# Patient Record
Sex: MALE | Race: WHITE
[De-identification: names, ages, dates, MRNs, and addresses within clinical notes are randomized per-mention and may not be internally consistent; named-entity substitution may affect disease eponyms.]

---

## 2017-02-24 ENCOUNTER — HOSPITAL ENCOUNTER (EMERGENCY)
Dept: HOSPITAL 19 - COL.ER | Age: 77
Discharge: HOME | End: 2017-02-24
Payer: MEDICARE

## 2017-02-24 VITALS — DIASTOLIC BLOOD PRESSURE: 88 MMHG | SYSTOLIC BLOOD PRESSURE: 141 MMHG | HEART RATE: 83 BPM

## 2017-02-24 VITALS — TEMPERATURE: 98.4 F

## 2017-02-24 VITALS — BODY MASS INDEX: 27.8 KG/M2 | HEIGHT: 67.99 IN | WEIGHT: 183.42 LBS

## 2017-02-24 DIAGNOSIS — R20.0: Primary | ICD-10-CM

## 2017-02-24 DIAGNOSIS — Z95.5: ICD-10-CM

## 2017-02-24 DIAGNOSIS — I10: ICD-10-CM

## 2017-02-24 LAB
ADJUSTED CALCIUM: 9.7 MG/DL (ref 8.4–10.2)
ALBUMIN SERPL-MCNC: 4.2 GM/DL (ref 3.5–5)
ALP SERPL-CCNC: 70 U/L (ref 50–136)
ALT SERPL-CCNC: 29 U/L (ref 21–72)
ANION GAP SERPL CALC-SCNC: 12 MMOL/L (ref 7–16)
BASOPHILS # BLD: 0 10*3/UL (ref 0–0.2)
BASOPHILS NFR BLD AUTO: 0.5 % (ref 0–2)
BILIRUB SERPL-MCNC: 0.9 MG/DL (ref 0–1)
BUN SERPL-MCNC: 28 MG/DL (ref 9–20)
CALCIUM SERPL-MCNC: 9.9 MG/DL (ref 8.4–10.2)
CHLORIDE SERPL-SCNC: 100 MMOL/L (ref 98–107)
CO2 SERPL-SCNC: 27 MMOL/L (ref 22–30)
CREAT SERPL-SCNC: 1.21 MG/DL (ref 0.66–1.25)
CRP SERPL-MCNC: 1.1 MG/DL (ref 0–0.9)
EOSINOPHIL # BLD: 0 10*3/UL (ref 0–0.7)
EOSINOPHIL NFR BLD: 0.7 % (ref 0–4)
ERYTHROCYTE [DISTWIDTH] IN BLOOD BY AUTOMATED COUNT: 14.4 % (ref 11.5–14.5)
ERYTHROCYTE [SEDIMENTATION RATE] IN BLOOD: 6 MM/HR (ref 0–30)
GLUCOSE SERPL-MCNC: 129 MG/DL (ref 74–106)
GRANULOCYTES # BLD AUTO: 78.2 % (ref 42.2–75.2)
HCT VFR BLD AUTO: 34.8 % (ref 42–52)
HGB BLD-MCNC: 12.6 G/DL (ref 13.5–18)
LYMPHOCYTES # BLD: 0.8 10*3/UL (ref 1.2–3.4)
LYMPHOCYTES NFR BLD: 14.2 % (ref 20–51)
MAGNESIUM SERPL-MCNC: 1.7 MG/DL (ref 1.6–2.3)
MCH RBC QN AUTO: 35 PG (ref 27–31)
MCHC RBC AUTO-ENTMCNC: 36 G/DL (ref 33–37)
MCV RBC AUTO: 96 FL (ref 80–100)
MONOCYTES # BLD: 0.4 10*3/UL (ref 0.1–0.6)
MONOCYTES NFR BLD AUTO: 6.2 % (ref 1.7–9.3)
NEUTROPHILS # BLD: 4.5 10*3/UL (ref 1.4–6.5)
PLATELET # BLD AUTO: 193 K/MM3 (ref 130–400)
PMV BLD AUTO: 9.7 FL (ref 7.4–10.4)
POTASSIUM SERPL-SCNC: 4.2 MMOL/L (ref 3.4–5)
PROT SERPL-MCNC: 7.7 GM/DL (ref 6.4–8.2)
RBC # BLD AUTO: 3.62 M/MM3 (ref 4.2–5.6)
SODIUM SERPL-SCNC: 139 MMOL/L (ref 137–145)
WBC # BLD AUTO: 5.8 K/MM3 (ref 4.8–10.8)

## 2017-09-18 ENCOUNTER — HOSPITAL ENCOUNTER (OUTPATIENT)
Dept: HOSPITAL 19 - COL.RAD | Age: 77
End: 2017-09-18
Payer: MEDICARE

## 2017-09-18 DIAGNOSIS — R20.0: Primary | ICD-10-CM

## 2017-09-18 DIAGNOSIS — R20.2: ICD-10-CM

## 2017-10-19 ENCOUNTER — HOSPITAL ENCOUNTER (OUTPATIENT)
Dept: HOSPITAL 19 - SDCO | Age: 77
Discharge: HOME | End: 2017-10-19
Attending: UROLOGY
Payer: MEDICARE

## 2017-10-19 VITALS — SYSTOLIC BLOOD PRESSURE: 129 MMHG | DIASTOLIC BLOOD PRESSURE: 59 MMHG | HEART RATE: 78 BPM

## 2017-10-19 VITALS — HEIGHT: 68 IN | BODY MASS INDEX: 27.47 KG/M2 | WEIGHT: 181.22 LBS

## 2017-10-19 VITALS — SYSTOLIC BLOOD PRESSURE: 132 MMHG | HEART RATE: 63 BPM | DIASTOLIC BLOOD PRESSURE: 58 MMHG | TEMPERATURE: 98 F

## 2017-10-19 VITALS — HEART RATE: 76 BPM | DIASTOLIC BLOOD PRESSURE: 51 MMHG | SYSTOLIC BLOOD PRESSURE: 134 MMHG

## 2017-10-19 VITALS — HEART RATE: 93 BPM | SYSTOLIC BLOOD PRESSURE: 136 MMHG | TEMPERATURE: 97 F | DIASTOLIC BLOOD PRESSURE: 50 MMHG

## 2017-10-19 DIAGNOSIS — I25.10: ICD-10-CM

## 2017-10-19 DIAGNOSIS — Z87.891: ICD-10-CM

## 2017-10-19 DIAGNOSIS — Z85.54: Primary | ICD-10-CM

## 2017-10-19 DIAGNOSIS — I42.9: ICD-10-CM

## 2017-10-19 DIAGNOSIS — E11.9: ICD-10-CM

## 2017-10-19 DIAGNOSIS — G47.33: ICD-10-CM

## 2017-10-19 DIAGNOSIS — I10: ICD-10-CM

## 2017-10-19 DIAGNOSIS — N41.1: ICD-10-CM

## 2017-10-19 DIAGNOSIS — J44.9: ICD-10-CM

## 2017-10-19 DIAGNOSIS — Z85.828: ICD-10-CM

## 2017-10-19 DIAGNOSIS — K21.9: ICD-10-CM

## 2017-10-19 DIAGNOSIS — E78.5: ICD-10-CM

## 2017-10-19 DIAGNOSIS — M10.9: ICD-10-CM

## 2017-10-19 PROCEDURE — C1769 GUIDE WIRE: HCPCS

## 2018-07-16 ENCOUNTER — HOSPITAL ENCOUNTER (OUTPATIENT)
Dept: HOSPITAL 19 - COL.VAS | Age: 78
End: 2018-07-16
Attending: FAMILY MEDICINE
Payer: MEDICARE

## 2018-07-16 DIAGNOSIS — I08.0: Primary | ICD-10-CM

## 2019-01-03 ENCOUNTER — HOSPITAL ENCOUNTER (OUTPATIENT)
Dept: HOSPITAL 19 - COL.LAB | Age: 79
End: 2019-01-03
Attending: INTERNAL MEDICINE
Payer: MEDICARE

## 2019-01-03 DIAGNOSIS — R91.8: Primary | ICD-10-CM

## 2019-02-12 ENCOUNTER — HOSPITAL ENCOUNTER (OUTPATIENT)
Dept: HOSPITAL 19 - COL.LAB | Age: 79
End: 2019-02-12
Attending: THORACIC SURGERY (CARDIOTHORACIC VASCULAR SURGERY)
Payer: MEDICARE

## 2019-02-12 DIAGNOSIS — R06.02: ICD-10-CM

## 2019-02-12 DIAGNOSIS — R91.8: Primary | ICD-10-CM

## 2019-02-12 LAB
ERYTHROCYTE [DISTWIDTH] IN BLOOD BY AUTOMATED COUNT: 14.8 % (ref 11.5–14.5)
HCT VFR BLD AUTO: 35 % (ref 42–52)
HGB BLD-MCNC: 12 G/DL (ref 13.5–18)
MCH RBC QN AUTO: 33 PG (ref 27–31)
MCHC RBC AUTO-ENTMCNC: 34 G/DL (ref 33–37)
MCV RBC AUTO: 95 FL (ref 80–100)
PLATELET # BLD AUTO: 183 K/MM3 (ref 130–400)
PMV BLD AUTO: 9.9 FL (ref 7.4–10.4)
RBC # BLD AUTO: 3.69 M/MM3 (ref 4.2–5.6)

## 2019-06-04 ENCOUNTER — HOSPITAL ENCOUNTER (OUTPATIENT)
Dept: HOSPITAL 19 - ZCOL.LAB | Age: 79
End: 2019-06-04
Attending: FAMILY MEDICINE
Payer: MEDICARE

## 2019-06-04 DIAGNOSIS — J90: Primary | ICD-10-CM

## 2019-06-05 ENCOUNTER — HOSPITAL ENCOUNTER (OUTPATIENT)
Dept: HOSPITAL 19 - COL.RAD | Age: 79
End: 2019-06-05
Attending: FAMILY MEDICINE
Payer: MEDICARE

## 2019-06-05 DIAGNOSIS — Z92.3: ICD-10-CM

## 2019-06-05 DIAGNOSIS — J90: Primary | ICD-10-CM

## 2019-06-05 DIAGNOSIS — R91.8: ICD-10-CM

## 2019-06-07 ENCOUNTER — HOSPITAL ENCOUNTER (OUTPATIENT)
Dept: HOSPITAL 19 - COL.VAS | Age: 79
End: 2019-06-07
Attending: FAMILY MEDICINE
Payer: MEDICARE

## 2019-06-07 DIAGNOSIS — J90: ICD-10-CM

## 2019-06-07 DIAGNOSIS — I08.1: Primary | ICD-10-CM

## 2019-12-09 ENCOUNTER — HOSPITAL ENCOUNTER (OUTPATIENT)
Dept: HOSPITAL 19 - COL.RAD | Age: 79
End: 2019-12-09
Attending: FAMILY MEDICINE
Payer: MEDICARE

## 2019-12-09 DIAGNOSIS — R10.31: Primary | ICD-10-CM

## 2021-02-15 ENCOUNTER — HOSPITAL ENCOUNTER (INPATIENT)
Dept: HOSPITAL 19 - COL.ER | Age: 81
LOS: 1 days | Discharge: ANOTHER HOSPITAL | DRG: 280 | End: 2021-02-16
Attending: INTERNAL MEDICINE | Admitting: INTERNAL MEDICINE
Payer: MEDICARE

## 2021-02-15 VITALS — DIASTOLIC BLOOD PRESSURE: 75 MMHG | SYSTOLIC BLOOD PRESSURE: 109 MMHG | TEMPERATURE: 97.4 F | HEART RATE: 102 BPM

## 2021-02-15 VITALS — HEART RATE: 97 BPM | TEMPERATURE: 97.4 F | SYSTOLIC BLOOD PRESSURE: 105 MMHG | DIASTOLIC BLOOD PRESSURE: 66 MMHG

## 2021-02-15 VITALS — SYSTOLIC BLOOD PRESSURE: 104 MMHG | TEMPERATURE: 97.3 F | DIASTOLIC BLOOD PRESSURE: 93 MMHG | HEART RATE: 148 BPM

## 2021-02-15 VITALS — HEIGHT: 67.99 IN | BODY MASS INDEX: 28.23 KG/M2 | WEIGHT: 186.29 LBS

## 2021-02-15 DIAGNOSIS — K59.00: ICD-10-CM

## 2021-02-15 DIAGNOSIS — I51.3: ICD-10-CM

## 2021-02-15 DIAGNOSIS — I48.91: Primary | ICD-10-CM

## 2021-02-15 DIAGNOSIS — M10.9: ICD-10-CM

## 2021-02-15 DIAGNOSIS — Z85.828: ICD-10-CM

## 2021-02-15 DIAGNOSIS — E87.2: ICD-10-CM

## 2021-02-15 DIAGNOSIS — J96.01: ICD-10-CM

## 2021-02-15 DIAGNOSIS — I11.0: ICD-10-CM

## 2021-02-15 DIAGNOSIS — Z88.1: ICD-10-CM

## 2021-02-15 DIAGNOSIS — I50.21: ICD-10-CM

## 2021-02-15 DIAGNOSIS — N40.0: ICD-10-CM

## 2021-02-15 DIAGNOSIS — Z85.118: ICD-10-CM

## 2021-02-15 DIAGNOSIS — E11.65: ICD-10-CM

## 2021-02-15 DIAGNOSIS — M19.90: ICD-10-CM

## 2021-02-15 DIAGNOSIS — D53.9: ICD-10-CM

## 2021-02-15 DIAGNOSIS — E87.5: ICD-10-CM

## 2021-02-15 DIAGNOSIS — Z86.718: ICD-10-CM

## 2021-02-15 DIAGNOSIS — I21.A1: ICD-10-CM

## 2021-02-15 DIAGNOSIS — K21.9: ICD-10-CM

## 2021-02-15 DIAGNOSIS — Z66: ICD-10-CM

## 2021-02-15 DIAGNOSIS — Z87.891: ICD-10-CM

## 2021-02-15 DIAGNOSIS — N17.9: ICD-10-CM

## 2021-02-15 DIAGNOSIS — E87.1: ICD-10-CM

## 2021-02-15 LAB
ALBUMIN SERPL-MCNC: 4.5 GM/DL (ref 3.5–5)
ALP SERPL-CCNC: 77 U/L (ref 50–136)
ALT SERPL-CCNC: 24 U/L (ref 4–49)
ANION GAP SERPL CALC-SCNC: 16 MMOL/L (ref 7–16)
APTT PPP: 28.7 SECONDS (ref 26–37)
AST SERPL-CCNC: 26 U/L (ref 15–37)
BASOPHILS # BLD: 0 10*3/UL (ref 0–0.2)
BASOPHILS NFR BLD AUTO: 0.5 % (ref 0–2)
BILIRUB SERPL-MCNC: 1.1 MG/DL (ref 0–1)
BUN SERPL-MCNC: 69 MG/DL (ref 9–20)
CALCIUM SERPL-MCNC: 9.6 MG/DL (ref 8.4–10.2)
CHLORIDE SERPL-SCNC: 105 MMOL/L (ref 98–107)
CO2 SERPL-SCNC: 20 MMOL/L (ref 22–30)
CREAT SERPL-SCNC: 2.76 UMOL/L (ref 0.66–1.25)
EOSINOPHIL # BLD: 0 10*3/UL (ref 0–0.7)
EOSINOPHIL NFR BLD: 0.4 % (ref 0–4)
ERYTHROCYTE [DISTWIDTH] IN BLOOD BY AUTOMATED COUNT: 15.9 % (ref 11.5–14.5)
GLUCOSE SERPL-MCNC: 187 MG/DL (ref 74–106)
GRANULOCYTES # BLD AUTO: 80.6 % (ref 42.2–75.2)
HCT VFR BLD AUTO: 33.1 % (ref 42–52)
HCT VFR BLD AUTO: 34 % (ref 42–52)
HGB BLD-MCNC: 10.9 G/DL (ref 13.5–18)
HGB BLD-MCNC: 11.2 G/DL (ref 13.5–18)
INR BLD: 1.2 (ref 0.8–3)
LYMPHOCYTES # BLD: 0.6 10*3/UL (ref 1.2–3.4)
LYMPHOCYTES NFR BLD: 11.5 % (ref 20–51)
MCH RBC QN AUTO: 34 PG (ref 27–31)
MCHC RBC AUTO-ENTMCNC: 33 G/DL (ref 33–37)
MCV RBC AUTO: 103 FL (ref 80–100)
MONOCYTES # BLD: 0.4 10*3/UL (ref 0.1–0.6)
MONOCYTES NFR BLD AUTO: 6.8 % (ref 1.7–9.3)
NEUTROPHILS # BLD: 4.5 10*3/UL (ref 1.4–6.5)
PLATELET # BLD AUTO: 165 K/MM3 (ref 130–400)
PMV BLD AUTO: 11 FL (ref 7.4–10.4)
POTASSIUM SERPL-SCNC: 4.3 MMOL/L (ref 3.4–5)
PROT SERPL-MCNC: 7.7 GM/DL (ref 6.4–8.2)
PROTHROMBIN TIME: 13.4 SECONDS (ref 9.7–12.8)
RBC # BLD AUTO: 3.29 M/MM3 (ref 4.2–5.6)
SODIUM SERPL-SCNC: 140 MMOL/L (ref 137–145)
TROPONIN I SERPL-MCNC: 0.2 NG/ML (ref 0–0.04)

## 2021-02-15 PROCEDURE — C9113 INJ PANTOPRAZOLE SODIUM, VIA: HCPCS

## 2021-02-15 NOTE — NUR
Patient to room via cart from ER. Transfers self from cot to bathroom. Voids
and returns to room area. Patient sitting on edge of bed. MINERVA Macario student,
in to see patient.

## 2021-02-15 NOTE — NUR
Received report from Lena. Seen patient awake in bed. He reports having
shortness of breath when he tried to repositioned himself. SPO2 at 96% on room
air. Instructed patient to use the urinal on the bedside as he gets short of
breath with movement. Maintained patient on semi fowlers position. Call light
within reach.

## 2021-02-15 NOTE — NUR
Amirah, NP student, asks that this nurse contact Dr. Mauricio to see patient as
this is his normal cardiologist. Page sent to Dr. Mauricio. Dr. Mendieta at
nurses station and informed that we will consult Dr. Mauricio as this is the
patient's cardiologist. Dr. Mauricio returns call and is informed of consult. He
will see the patient in the morning.

## 2021-02-16 VITALS — TEMPERATURE: 97.5 F | HEART RATE: 104 BPM | DIASTOLIC BLOOD PRESSURE: 84 MMHG | SYSTOLIC BLOOD PRESSURE: 106 MMHG

## 2021-02-16 VITALS — DIASTOLIC BLOOD PRESSURE: 69 MMHG | SYSTOLIC BLOOD PRESSURE: 104 MMHG | TEMPERATURE: 97.5 F | HEART RATE: 67 BPM

## 2021-02-16 VITALS — DIASTOLIC BLOOD PRESSURE: 75 MMHG | TEMPERATURE: 97.5 F | HEART RATE: 52 BPM | SYSTOLIC BLOOD PRESSURE: 108 MMHG

## 2021-02-16 VITALS — TEMPERATURE: 97.5 F | SYSTOLIC BLOOD PRESSURE: 87 MMHG | HEART RATE: 77 BPM | DIASTOLIC BLOOD PRESSURE: 66 MMHG

## 2021-02-16 VITALS — HEART RATE: 78 BPM | SYSTOLIC BLOOD PRESSURE: 113 MMHG | DIASTOLIC BLOOD PRESSURE: 71 MMHG | TEMPERATURE: 97.5 F

## 2021-02-16 VITALS — HEART RATE: 94 BPM | SYSTOLIC BLOOD PRESSURE: 98 MMHG | DIASTOLIC BLOOD PRESSURE: 65 MMHG | TEMPERATURE: 97.4 F

## 2021-02-16 VITALS — DIASTOLIC BLOOD PRESSURE: 70 MMHG | HEART RATE: 97 BPM | SYSTOLIC BLOOD PRESSURE: 100 MMHG

## 2021-02-16 LAB
ALBUMIN SERPL-MCNC: 4 GM/DL (ref 3.5–5)
ALP SERPL-CCNC: 60 U/L (ref 50–136)
ALT SERPL-CCNC: 22 U/L (ref 4–49)
ANION GAP SERPL CALC-SCNC: 11 MMOL/L (ref 7–16)
ANION GAP SERPL CALC-SCNC: 12 MMOL/L (ref 7–16)
AST SERPL-CCNC: 30 U/L (ref 15–37)
BILIRUB SERPL-MCNC: 0.9 MG/DL (ref 0–1)
BUN SERPL-MCNC: 77 MG/DL (ref 9–20)
BUN SERPL-MCNC: 78 MG/DL (ref 9–20)
CALCIUM SERPL-MCNC: 8.9 MG/DL (ref 8.4–10.2)
CALCIUM SERPL-MCNC: 9.2 MG/DL (ref 8.4–10.2)
CHLORIDE SERPL-SCNC: 102 MMOL/L (ref 98–107)
CHLORIDE SERPL-SCNC: 104 MMOL/L (ref 98–107)
CO2 SERPL-SCNC: 18 MMOL/L (ref 22–30)
CO2 SERPL-SCNC: 19 MMOL/L (ref 22–30)
CREAT SERPL-SCNC: 3.15 UMOL/L (ref 0.66–1.25)
CREAT SERPL-SCNC: 3.24 UMOL/L (ref 0.66–1.25)
ERYTHROCYTE [DISTWIDTH] IN BLOOD BY AUTOMATED COUNT: 16.9 % (ref 11.5–14.5)
FOLATE (FOLIC ACID): >20 NG/ML (ref 7–31.4)
GLUCOSE SERPL-MCNC: 186 MG/DL (ref 74–106)
GLUCOSE SERPL-MCNC: 340 MG/DL (ref 74–106)
HCT VFR BLD AUTO: 32.6 % (ref 42–52)
HGB BLD-MCNC: 10.9 G/DL (ref 13.5–18)
HYALINE CASTS #/AREA URNS LPF: >12 /LPF
MCH RBC QN AUTO: 36 PG (ref 27–31)
MCHC RBC AUTO-ENTMCNC: 33 G/DL (ref 33–37)
MCV RBC AUTO: 108 FL (ref 80–100)
PH UR STRIP.AUTO: 5 [PH] (ref 5–8)
PLATELET # BLD AUTO: 150 K/MM3 (ref 130–400)
PMV BLD AUTO: 11.5 FL (ref 7.4–10.4)
POTASSIUM SERPL-SCNC: 4.7 MMOL/L (ref 3.4–5)
POTASSIUM SERPL-SCNC: 5.3 MMOL/L (ref 3.4–5)
PROT SERPL-MCNC: 7.1 GM/DL (ref 6.4–8.2)
RBC # BLD AUTO: 3.03 M/MM3 (ref 4.2–5.6)
RBC # UR: (no result) /HPF
SODIUM SERPL-SCNC: 132 MMOL/L (ref 137–145)
SODIUM SERPL-SCNC: 134 MMOL/L (ref 137–145)
SP GR UR STRIP.AUTO: 1.01 (ref 1–1.03)
SQUAMOUS # URNS: (no result) /HPF
TROPONIN I SERPL-MCNC: 0.21 NG/ML (ref 0–0.04)
URN COLLECT METHOD CLASS: (no result)

## 2021-02-16 NOTE — NUR
Patient left the floor at this time via EMS transport. All belongings were
transferred with the patient. Heparin drip was continued via EMS pump. O2 also
continued with EMS. Family is aware of transport. Report to [facility] has been
called. No further questions or concerns.

## 2021-02-16 NOTE — NUR
Assessment complete. Patient awake relaxingin bed at this time. He is alert
and oriented, pleasant.  No complaints of pain or discomfort were expressed.
Wife is now at the bed side. Heparin drip continues to infuse at this time at
13ml/hr, next recheck is scheduled.  IV site remains CD&I. Patient questioned
when he may be able to go home, I assured him he may have a better idea after
rounding this morning. Patient remains independent in room with no issues.
Will continue to [facility]. CAll light is in reach.

## 2021-02-16 NOTE — NUR
HepXa level 0.66 in goal range at this time, this is the second level within
goal. Patient will be rechecked again in AM, order has been placed for this.
Continuing to monitor.

## 2021-02-16 NOTE — NUR
Informed Ev BLACK regarding morning lab results of the patient. Potassium, BUN
and Crea was elevated. Informed Olga Of RT regarding O2 at 2lpm of the
patient.

## 2021-02-16 NOTE — NUR
attended clinical rounds with the team. Per nurse the patient is
independent in the room. GI consulted. Nephrology consulted. Cardiology
consulted.

## 2021-02-16 NOTE — NUR
Bladder sapn was unremarkable with no more than 20 ml in bladder.Per provider
request I spoke with patient about using an external catheter in order to
ensure that we are able to keep track of his urine output as he cannot contril
his urine while having a bowel movement. Patient was hesitant and refused the
external cath. I encouraged him that due to his refusal of this his would need
to ensure that the urinal is present and in place prior to any bowel
movements. He agreed to this. Will continue to monitor.

## 2021-02-16 NOTE — NUR
met with patient and patient's wife, Renee (ph#[phone]) to
discuss discharge planning. Patient lives in [place] with Renee and sees Dr. Easton at [facility] for primary care. Patient obtains
medications at [facility] with no difficulties and does not use any DME.
Patient is currently on 2 liters of oxygen but states he is not on oxygen at
baseline. Patient reports independence with ADLS and plans to return home upon
discharge. Patient states he has Advance Directives completed through the
[employer] [facility]. Following intake, CASIE contacted SHELDON JuarezCM at
[facility] who advised they have DPOA-HC on file and would
fax it to the Medical floor. CASIE will continue to follow for discharge needs.

## 2021-02-16 NOTE — NUR
Recheck patient's SPO2. He was at 89-90% on room air. Placed patient on O2 at
2lpm via NC, SPO2 now at 92%.

## 2021-02-16 NOTE — NUR
Patient arrived back from procedure at this time. Patient is aware of his
status and the need for transfer at this time due to the lack of ICU beds at
this time. Patient is aware that he will likely transfer some time today.
Heparin drip continues to run at this time at 13 ml/hrs. Vitals are being
monitored per post procedure protocol. PAtient is awake and alert at this
time. no complaints of pain or discomfort. Continuing to monitor. Wife remains
at the bedside. Call light is in reach.

## 2021-04-12 ENCOUNTER — HOSPITAL ENCOUNTER (INPATIENT)
Dept: HOSPITAL 19 - COL.ER | Age: 81
LOS: 2 days | Discharge: ANOTHER HOSPITAL | DRG: 377 | End: 2021-04-14
Attending: STUDENT IN AN ORGANIZED HEALTH CARE EDUCATION/TRAINING PROGRAM | Admitting: STUDENT IN AN ORGANIZED HEALTH CARE EDUCATION/TRAINING PROGRAM
Payer: MEDICARE

## 2021-04-12 VITALS — OXYGEN SATURATION: 98 %

## 2021-04-12 VITALS — OXYGEN SATURATION: 100 %

## 2021-04-12 VITALS — SYSTOLIC BLOOD PRESSURE: 104 MMHG | TEMPERATURE: 97.9 F | HEART RATE: 118 BPM | DIASTOLIC BLOOD PRESSURE: 51 MMHG

## 2021-04-12 VITALS — OXYGEN SATURATION: 99 %

## 2021-04-12 VITALS — OXYGEN SATURATION: 84 %

## 2021-04-12 VITALS — OXYGEN SATURATION: 97 %

## 2021-04-12 VITALS — OXYGEN SATURATION: 90 %

## 2021-04-12 VITALS — OXYGEN SATURATION: 96 %

## 2021-04-12 VITALS — HEART RATE: 138 BPM | SYSTOLIC BLOOD PRESSURE: 113 MMHG | DIASTOLIC BLOOD PRESSURE: 79 MMHG | TEMPERATURE: 97.3 F

## 2021-04-12 VITALS — OXYGEN SATURATION: 93 %

## 2021-04-12 VITALS — OXYGEN SATURATION: 87 %

## 2021-04-12 VITALS — OXYGEN SATURATION: 95 %

## 2021-04-12 VITALS — OXYGEN SATURATION: 89 %

## 2021-04-12 VITALS — SYSTOLIC BLOOD PRESSURE: 111 MMHG | TEMPERATURE: 98.6 F | DIASTOLIC BLOOD PRESSURE: 75 MMHG | HEART RATE: 152 BPM

## 2021-04-12 VITALS — TEMPERATURE: 98.5 F | DIASTOLIC BLOOD PRESSURE: 71 MMHG | HEART RATE: 106 BPM | SYSTOLIC BLOOD PRESSURE: 140 MMHG

## 2021-04-12 VITALS — SYSTOLIC BLOOD PRESSURE: 108 MMHG | TEMPERATURE: 98.4 F | DIASTOLIC BLOOD PRESSURE: 63 MMHG | HEART RATE: 132 BPM

## 2021-04-12 VITALS — OXYGEN SATURATION: 92 %

## 2021-04-12 VITALS — OXYGEN SATURATION: 83 %

## 2021-04-12 VITALS — OXYGEN SATURATION: 74 %

## 2021-04-12 VITALS — BODY MASS INDEX: 25.13 KG/M2 | HEIGHT: 67.99 IN | WEIGHT: 165.79 LBS

## 2021-04-12 VITALS
OXYGEN SATURATION: 100 % | HEART RATE: 98 BPM | DIASTOLIC BLOOD PRESSURE: 64 MMHG | TEMPERATURE: 97.5 F | SYSTOLIC BLOOD PRESSURE: 132 MMHG

## 2021-04-12 VITALS
HEART RATE: 114 BPM | OXYGEN SATURATION: 98 % | TEMPERATURE: 97.5 F | SYSTOLIC BLOOD PRESSURE: 138 MMHG | DIASTOLIC BLOOD PRESSURE: 61 MMHG

## 2021-04-12 VITALS
SYSTOLIC BLOOD PRESSURE: 138 MMHG | OXYGEN SATURATION: 98 % | HEART RATE: 120 BPM | TEMPERATURE: 97.4 F | DIASTOLIC BLOOD PRESSURE: 61 MMHG

## 2021-04-12 VITALS — DIASTOLIC BLOOD PRESSURE: 54 MMHG | HEART RATE: 116 BPM | SYSTOLIC BLOOD PRESSURE: 102 MMHG | TEMPERATURE: 98.1 F

## 2021-04-12 VITALS — OXYGEN SATURATION: 94 %

## 2021-04-12 VITALS — OXYGEN SATURATION: 81 %

## 2021-04-12 VITALS — TEMPERATURE: 97.9 F | DIASTOLIC BLOOD PRESSURE: 53 MMHG | SYSTOLIC BLOOD PRESSURE: 112 MMHG | HEART RATE: 131 BPM

## 2021-04-12 VITALS — OXYGEN SATURATION: 86 %

## 2021-04-12 VITALS — OXYGEN SATURATION: 85 %

## 2021-04-12 VITALS — OXYGEN SATURATION: 88 %

## 2021-04-12 VITALS — OXYGEN SATURATION: 80 %

## 2021-04-12 DIAGNOSIS — Z87.891: ICD-10-CM

## 2021-04-12 DIAGNOSIS — I95.9: ICD-10-CM

## 2021-04-12 DIAGNOSIS — I21.A1: ICD-10-CM

## 2021-04-12 DIAGNOSIS — N18.9: ICD-10-CM

## 2021-04-12 DIAGNOSIS — E87.2: ICD-10-CM

## 2021-04-12 DIAGNOSIS — Z85.828: ICD-10-CM

## 2021-04-12 DIAGNOSIS — K44.9: ICD-10-CM

## 2021-04-12 DIAGNOSIS — R79.1: ICD-10-CM

## 2021-04-12 DIAGNOSIS — I51.3: ICD-10-CM

## 2021-04-12 DIAGNOSIS — I48.91: ICD-10-CM

## 2021-04-12 DIAGNOSIS — Z85.118: ICD-10-CM

## 2021-04-12 DIAGNOSIS — N17.9: ICD-10-CM

## 2021-04-12 DIAGNOSIS — R73.9: ICD-10-CM

## 2021-04-12 DIAGNOSIS — I50.22: ICD-10-CM

## 2021-04-12 DIAGNOSIS — Z92.3: ICD-10-CM

## 2021-04-12 DIAGNOSIS — D62: ICD-10-CM

## 2021-04-12 DIAGNOSIS — Z88.0: ICD-10-CM

## 2021-04-12 DIAGNOSIS — Z79.01: ICD-10-CM

## 2021-04-12 DIAGNOSIS — I42.9: ICD-10-CM

## 2021-04-12 DIAGNOSIS — M10.9: ICD-10-CM

## 2021-04-12 DIAGNOSIS — K57.31: Primary | ICD-10-CM

## 2021-04-12 DIAGNOSIS — M19.90: ICD-10-CM

## 2021-04-12 LAB
ALBUMIN SERPL-MCNC: 3 GM/DL (ref 3.5–5)
ALP SERPL-CCNC: 56 U/L (ref 50–136)
ALT SERPL-CCNC: 22 U/L (ref 4–49)
ANION GAP SERPL CALC-SCNC: 16 MMOL/L (ref 7–16)
AST SERPL-CCNC: 30 U/L (ref 15–37)
BASOPHILS # BLD: 0 10*3/UL (ref 0–0.2)
BASOPHILS NFR BLD AUTO: 0.3 % (ref 0–2)
BILIRUB SERPL-MCNC: 0.2 MG/DL (ref 0–1)
BUN SERPL-MCNC: 58 MG/DL (ref 9–20)
CALCIUM SERPL-MCNC: 8.1 MG/DL (ref 8.4–10.2)
CHLORIDE SERPL-SCNC: 108 MMOL/L (ref 98–107)
CO2 SERPL-SCNC: 18 MMOL/L (ref 22–30)
CREAT SERPL-SCNC: 1.98 UMOL/L (ref 0.66–1.25)
EOSINOPHIL # BLD: 0 10*3/UL (ref 0–0.7)
EOSINOPHIL NFR BLD: 0.4 % (ref 0–4)
ERYTHROCYTE [DISTWIDTH] IN BLOOD BY AUTOMATED COUNT: 17.2 % (ref 11.5–14.5)
GLUCOSE SERPL-MCNC: 312 MG/DL (ref 74–106)
GRANULOCYTES # BLD AUTO: 73.4 % (ref 42.2–75.2)
HCT VFR BLD AUTO: 16.4 % (ref 42–52)
HCT VFR BLD AUTO: 20.2 % (ref 42–52)
HGB BLD-MCNC: 5.5 G/DL (ref 13.5–18)
HGB BLD-MCNC: 7.1 G/DL (ref 13.5–18)
INR BLD: 5.8 (ref 0.8–3)
LYMPHOCYTES # BLD: 1.8 10*3/UL (ref 1.2–3.4)
LYMPHOCYTES NFR BLD: 20.1 % (ref 20–51)
MCH RBC QN AUTO: 37 PG (ref 27–31)
MCHC RBC AUTO-ENTMCNC: 34 G/DL (ref 33–37)
MCV RBC AUTO: 110 FL (ref 80–100)
MONOCYTES # BLD: 0.5 10*3/UL (ref 0.1–0.6)
MONOCYTES NFR BLD AUTO: 5.4 % (ref 1.7–9.3)
NEUTROPHILS # BLD: 6.6 10*3/UL (ref 1.4–6.5)
PLATELET # BLD AUTO: 153 K/MM3 (ref 130–400)
PMV BLD AUTO: 10.1 FL (ref 7.4–10.4)
POTASSIUM SERPL-SCNC: 3.5 MMOL/L (ref 3.4–5)
PROT SERPL-MCNC: 5.3 GM/DL (ref 6.4–8.2)
PROTHROMBIN TIME: 66.6 SECONDS (ref 9.7–12.8)
RBC # BLD AUTO: 1.49 M/MM3 (ref 4.2–5.6)
SODIUM SERPL-SCNC: 142 MMOL/L (ref 137–145)
TROPONIN I SERPL-MCNC: 0.06 NG/ML (ref 0–0.04)

## 2021-04-12 PROCEDURE — C9113 INJ PANTOPRAZOLE SODIUM, VIA: HCPCS

## 2021-04-12 PROCEDURE — C1751 CATH, INF, PER/CENT/MIDLINE: HCPCS

## 2021-04-12 PROCEDURE — C9132 KCENTRA, PER I.U.: HCPCS

## 2021-04-12 PROCEDURE — P9016 RBC LEUKOCYTES REDUCED: HCPCS

## 2021-04-12 PROCEDURE — 02HV33Z INSERTION OF INFUSION DEVICE INTO SUPERIOR VENA CAVA, PERCUTANEOUS APPROACH: ICD-10-PCS | Performed by: STUDENT IN AN ORGANIZED HEALTH CARE EDUCATION/TRAINING PROGRAM

## 2021-04-12 NOTE — NUR
Endoscopy scheduling and anesthesia associates notified of egd/colonoscopy
for 04/13/2021 Tuesday at 1330.

## 2021-04-13 VITALS — OXYGEN SATURATION: 100 %

## 2021-04-13 VITALS — OXYGEN SATURATION: 99 %

## 2021-04-13 VITALS — OXYGEN SATURATION: 97 %

## 2021-04-13 VITALS — OXYGEN SATURATION: 98 %

## 2021-04-13 VITALS — OXYGEN SATURATION: 96 %

## 2021-04-13 VITALS
TEMPERATURE: 98 F | SYSTOLIC BLOOD PRESSURE: 100 MMHG | HEART RATE: 79 BPM | DIASTOLIC BLOOD PRESSURE: 79 MMHG | OXYGEN SATURATION: 94 %

## 2021-04-13 VITALS — OXYGEN SATURATION: 95 %

## 2021-04-13 VITALS — OXYGEN SATURATION: 87 %

## 2021-04-13 VITALS — DIASTOLIC BLOOD PRESSURE: 78 MMHG | SYSTOLIC BLOOD PRESSURE: 123 MMHG | TEMPERATURE: 98.4 F | HEART RATE: 86 BPM

## 2021-04-13 VITALS — OXYGEN SATURATION: 93 %

## 2021-04-13 VITALS
OXYGEN SATURATION: 98 % | TEMPERATURE: 98.3 F | HEART RATE: 126 BPM | SYSTOLIC BLOOD PRESSURE: 141 MMHG | DIASTOLIC BLOOD PRESSURE: 68 MMHG

## 2021-04-13 VITALS — OXYGEN SATURATION: 86 %

## 2021-04-13 VITALS — OXYGEN SATURATION: 90 %

## 2021-04-13 VITALS
DIASTOLIC BLOOD PRESSURE: 48 MMHG | SYSTOLIC BLOOD PRESSURE: 122 MMHG | OXYGEN SATURATION: 93 % | HEART RATE: 104 BPM | TEMPERATURE: 98.2 F

## 2021-04-13 VITALS — OXYGEN SATURATION: 84 %

## 2021-04-13 VITALS — OXYGEN SATURATION: 92 %

## 2021-04-13 VITALS
OXYGEN SATURATION: 97 % | DIASTOLIC BLOOD PRESSURE: 70 MMHG | HEART RATE: 96 BPM | TEMPERATURE: 98.4 F | SYSTOLIC BLOOD PRESSURE: 136 MMHG

## 2021-04-13 VITALS
DIASTOLIC BLOOD PRESSURE: 86 MMHG | OXYGEN SATURATION: 98 % | TEMPERATURE: 98.4 F | SYSTOLIC BLOOD PRESSURE: 130 MMHG | HEART RATE: 109 BPM

## 2021-04-13 VITALS — HEART RATE: 89 BPM | SYSTOLIC BLOOD PRESSURE: 139 MMHG | TEMPERATURE: 98.9 F | DIASTOLIC BLOOD PRESSURE: 68 MMHG

## 2021-04-13 VITALS — OXYGEN SATURATION: 94 %

## 2021-04-13 VITALS — OXYGEN SATURATION: 82 %

## 2021-04-13 VITALS
TEMPERATURE: 98.2 F | OXYGEN SATURATION: 95 % | HEART RATE: 84 BPM | SYSTOLIC BLOOD PRESSURE: 126 MMHG | DIASTOLIC BLOOD PRESSURE: 50 MMHG

## 2021-04-13 VITALS — HEART RATE: 103 BPM | SYSTOLIC BLOOD PRESSURE: 118 MMHG | TEMPERATURE: 98.9 F | DIASTOLIC BLOOD PRESSURE: 63 MMHG

## 2021-04-13 VITALS — DIASTOLIC BLOOD PRESSURE: 61 MMHG | HEART RATE: 111 BPM | SYSTOLIC BLOOD PRESSURE: 138 MMHG | TEMPERATURE: 97.8 F

## 2021-04-13 VITALS — OXYGEN SATURATION: 91 %

## 2021-04-13 VITALS — OXYGEN SATURATION: 81 %

## 2021-04-13 VITALS — DIASTOLIC BLOOD PRESSURE: 67 MMHG | TEMPERATURE: 98.3 F | HEART RATE: 156 BPM | SYSTOLIC BLOOD PRESSURE: 143 MMHG

## 2021-04-13 VITALS — OXYGEN SATURATION: 79 %

## 2021-04-13 VITALS
HEART RATE: 93 BPM | OXYGEN SATURATION: 98 % | DIASTOLIC BLOOD PRESSURE: 54 MMHG | SYSTOLIC BLOOD PRESSURE: 138 MMHG | TEMPERATURE: 98 F

## 2021-04-13 VITALS
SYSTOLIC BLOOD PRESSURE: 138 MMHG | OXYGEN SATURATION: 98 % | HEART RATE: 80 BPM | TEMPERATURE: 98.7 F | DIASTOLIC BLOOD PRESSURE: 70 MMHG

## 2021-04-13 VITALS — SYSTOLIC BLOOD PRESSURE: 135 MMHG | TEMPERATURE: 98.3 F | HEART RATE: 112 BPM | DIASTOLIC BLOOD PRESSURE: 65 MMHG

## 2021-04-13 VITALS
SYSTOLIC BLOOD PRESSURE: 138 MMHG | HEART RATE: 96 BPM | DIASTOLIC BLOOD PRESSURE: 70 MMHG | TEMPERATURE: 98.7 F | OXYGEN SATURATION: 98 %

## 2021-04-13 VITALS
DIASTOLIC BLOOD PRESSURE: 48 MMHG | HEART RATE: 112 BPM | OXYGEN SATURATION: 97 % | TEMPERATURE: 98.2 F | SYSTOLIC BLOOD PRESSURE: 130 MMHG

## 2021-04-13 VITALS — HEART RATE: 103 BPM | DIASTOLIC BLOOD PRESSURE: 70 MMHG | TEMPERATURE: 98.9 F | SYSTOLIC BLOOD PRESSURE: 127 MMHG

## 2021-04-13 VITALS — HEART RATE: 106 BPM | SYSTOLIC BLOOD PRESSURE: 119 MMHG | TEMPERATURE: 98.2 F | DIASTOLIC BLOOD PRESSURE: 54 MMHG

## 2021-04-13 VITALS — OXYGEN SATURATION: 77 %

## 2021-04-13 VITALS — OXYGEN SATURATION: 88 %

## 2021-04-13 VITALS — OXYGEN SATURATION: 99 % | HEART RATE: 111 BPM

## 2021-04-13 VITALS
HEART RATE: 92 BPM | OXYGEN SATURATION: 98 % | SYSTOLIC BLOOD PRESSURE: 126 MMHG | TEMPERATURE: 98 F | DIASTOLIC BLOOD PRESSURE: 77 MMHG

## 2021-04-13 VITALS — OXYGEN SATURATION: 83 %

## 2021-04-13 VITALS — DIASTOLIC BLOOD PRESSURE: 58 MMHG | TEMPERATURE: 98 F | SYSTOLIC BLOOD PRESSURE: 131 MMHG | HEART RATE: 84 BPM

## 2021-04-13 VITALS
TEMPERATURE: 99 F | HEART RATE: 81 BPM | SYSTOLIC BLOOD PRESSURE: 121 MMHG | OXYGEN SATURATION: 97 % | DIASTOLIC BLOOD PRESSURE: 94 MMHG

## 2021-04-13 VITALS — HEART RATE: 84 BPM | DIASTOLIC BLOOD PRESSURE: 64 MMHG | TEMPERATURE: 98.6 F | SYSTOLIC BLOOD PRESSURE: 111 MMHG

## 2021-04-13 VITALS — OXYGEN SATURATION: 85 %

## 2021-04-13 VITALS — OXYGEN SATURATION: 89 %

## 2021-04-13 VITALS — OXYGEN SATURATION: 78 %

## 2021-04-13 LAB
ANION GAP SERPL CALC-SCNC: 8 MMOL/L (ref 7–16)
BASOPHILS # BLD: 0 10*3/UL (ref 0–0.2)
BASOPHILS NFR BLD AUTO: 0.3 % (ref 0–2)
BUN SERPL-MCNC: 50 MG/DL (ref 9–20)
CALCIUM SERPL-MCNC: 7.2 MG/DL (ref 8.4–10.2)
CHLORIDE SERPL-SCNC: 104 MMOL/L (ref 98–107)
CO2 SERPL-SCNC: 26 MMOL/L (ref 22–30)
CREAT SERPL-SCNC: 1.88 UMOL/L (ref 0.66–1.25)
EOSINOPHIL # BLD: 0.1 10*3/UL (ref 0–0.7)
EOSINOPHIL NFR BLD: 0.8 % (ref 0–4)
ERYTHROCYTE [DISTWIDTH] IN BLOOD BY AUTOMATED COUNT: 18 % (ref 11.5–14.5)
GLUCOSE SERPL-MCNC: 175 MG/DL (ref 74–106)
GRANULOCYTES # BLD AUTO: 71.7 % (ref 42.2–75.2)
HCT VFR BLD AUTO: 21 % (ref 42–52)
HCT VFR BLD AUTO: 21 % (ref 42–52)
HCT VFR BLD AUTO: 23.4 % (ref 42–52)
HGB BLD-MCNC: 6.8 G/DL (ref 13.5–18)
HGB BLD-MCNC: 7.3 G/DL (ref 13.5–18)
HGB BLD-MCNC: 7.9 G/DL (ref 13.5–18)
INR BLD: 1.3 (ref 0.8–3)
LYMPHOCYTES # BLD: 1.3 10*3/UL (ref 1.2–3.4)
LYMPHOCYTES NFR BLD: 18.2 % (ref 20–51)
MCH RBC QN AUTO: 35 PG (ref 27–31)
MCHC RBC AUTO-ENTMCNC: 35 G/DL (ref 33–37)
MCV RBC AUTO: 100 FL (ref 80–100)
MONOCYTES # BLD: 0.6 10*3/UL (ref 0.1–0.6)
MONOCYTES NFR BLD AUTO: 8.3 % (ref 1.7–9.3)
NEUTROPHILS # BLD: 5.1 10*3/UL (ref 1.4–6.5)
PLATELET # BLD AUTO: 120 K/MM3 (ref 130–400)
PMV BLD AUTO: 10.1 FL (ref 7.4–10.4)
POTASSIUM SERPL-SCNC: 3.3 MMOL/L (ref 3.4–5)
PROTHROMBIN TIME: 14.3 SECONDS (ref 9.7–12.8)
RBC # BLD AUTO: 2.1 M/MM3 (ref 4.2–5.6)
SODIUM SERPL-SCNC: 138 MMOL/L (ref 137–145)

## 2021-04-13 PROCEDURE — 0DJD8ZZ INSPECTION OF LOWER INTESTINAL TRACT, VIA NATURAL OR ARTIFICIAL OPENING ENDOSCOPIC: ICD-10-PCS | Performed by: PSYCHIATRY & NEUROLOGY

## 2021-04-13 PROCEDURE — 0DJ08ZZ INSPECTION OF UPPER INTESTINAL TRACT, VIA NATURAL OR ARTIFICIAL OPENING ENDOSCOPIC: ICD-10-PCS | Performed by: SPECIALIST

## 2021-04-13 NOTE — NUR
met with the patient to complete intake. The patient lives in
[place] with his wife, Jennifer. The patient is independent with ADLs. The
patient as a cane and walker but does not use them to ambulate. The patient's
PCP is Dr. Easton and patient receives medications from [facility], they
can deliver if needed. The patient has advanced directives in the EMR. The
primary power of  for health care is his wife Jennifer, seconadary is
his sister, Denia. The patient plans to return home at discharge, his wife
will provide transporation. SW staffed with the patient's nurse to have PT/OT
ordered.
 
 attended clinical rounds with the team. As of this morning, the
patient to have EGD and colonoscopy this afternoon.
 
Discharge disposition at this time: Home with wife

## 2021-04-13 NOTE — NUR
CALLED AND NOTIFIED PT IS BACK FROM ENDO.  HAD CALLED
AND UPDATED . CLEAR LIQUID DIET ORDERED. NO ORDER FOR BLOOD AT THIS
TIME. WILL RECHECK H&H AT 1800.

## 2021-04-13 NOTE — NUR
Report received from ELIJAH Jane. All questions answered and all medications
verified. VSS. Patient laying in bed resting watching tv. No concerns or
complaints noted at this time. Will resume care at this time.

## 2021-04-13 NOTE — NUR
PT RESTING IN BED. VSS. PT NPO FOR COLONOSCOPY AND EGD. PT TO GET ANOTHER UNIT
OF PRBC'S. WILL CONTINUE TO MONITOR.

## 2021-04-13 NOTE — NUR
HUBERT BLACK NOTIFIED OF HGB 6.8. PT HAS HAD NO BLOOD STOOLS SINCE RETURN FROM
EGD/COLONOSCOPY. ORDER RECEIVED TO GIVE ANOTHER UNIT PRBC.

## 2021-04-13 NOTE — NUR
Blood transfusion started at this time. Blood verified with ELIJAH Millard. Will
remain at the bedside for first 15 minutes of transfusion. Pt educated on the
s/sx's of an adverse reaction. Pt verbalizes understanding.

## 2021-04-14 VITALS — OXYGEN SATURATION: 97 %

## 2021-04-14 VITALS — OXYGEN SATURATION: 93 %

## 2021-04-14 VITALS — OXYGEN SATURATION: 94 %

## 2021-04-14 VITALS — OXYGEN SATURATION: 96 %

## 2021-04-14 VITALS — OXYGEN SATURATION: 99 %

## 2021-04-14 VITALS — OXYGEN SATURATION: 92 %

## 2021-04-14 VITALS — OXYGEN SATURATION: 88 %

## 2021-04-14 VITALS — OXYGEN SATURATION: 95 %

## 2021-04-14 VITALS — OXYGEN SATURATION: 91 %

## 2021-04-14 VITALS — OXYGEN SATURATION: 84 %

## 2021-04-14 VITALS — OXYGEN SATURATION: 98 %

## 2021-04-14 VITALS — OXYGEN SATURATION: 90 %

## 2021-04-14 VITALS — OXYGEN SATURATION: 86 %

## 2021-04-14 VITALS — OXYGEN SATURATION: 80 %

## 2021-04-14 VITALS
HEART RATE: 110 BPM | TEMPERATURE: 98.3 F | OXYGEN SATURATION: 88 % | SYSTOLIC BLOOD PRESSURE: 145 MMHG | DIASTOLIC BLOOD PRESSURE: 72 MMHG

## 2021-04-14 VITALS — OXYGEN SATURATION: 82 %

## 2021-04-14 VITALS — SYSTOLIC BLOOD PRESSURE: 97 MMHG | TEMPERATURE: 98.5 F | HEART RATE: 90 BPM | DIASTOLIC BLOOD PRESSURE: 79 MMHG

## 2021-04-14 VITALS — OXYGEN SATURATION: 100 %

## 2021-04-14 VITALS — DIASTOLIC BLOOD PRESSURE: 61 MMHG | SYSTOLIC BLOOD PRESSURE: 146 MMHG | TEMPERATURE: 98.4 F | HEART RATE: 97 BPM

## 2021-04-14 VITALS
TEMPERATURE: 97.7 F | HEART RATE: 73 BPM | SYSTOLIC BLOOD PRESSURE: 129 MMHG | OXYGEN SATURATION: 96 % | DIASTOLIC BLOOD PRESSURE: 60 MMHG

## 2021-04-14 VITALS — SYSTOLIC BLOOD PRESSURE: 146 MMHG | TEMPERATURE: 98.1 F | DIASTOLIC BLOOD PRESSURE: 65 MMHG | HEART RATE: 80 BPM

## 2021-04-14 VITALS — OXYGEN SATURATION: 89 %

## 2021-04-14 VITALS — TEMPERATURE: 98.1 F | SYSTOLIC BLOOD PRESSURE: 145 MMHG | HEART RATE: 84 BPM | DIASTOLIC BLOOD PRESSURE: 52 MMHG

## 2021-04-14 VITALS — DIASTOLIC BLOOD PRESSURE: 58 MMHG | SYSTOLIC BLOOD PRESSURE: 115 MMHG | HEART RATE: 85 BPM | TEMPERATURE: 98.2 F

## 2021-04-14 VITALS — OXYGEN SATURATION: 83 %

## 2021-04-14 VITALS — OXYGEN SATURATION: 87 %

## 2021-04-14 VITALS — OXYGEN SATURATION: 78 %

## 2021-04-14 VITALS — OXYGEN SATURATION: 85 %

## 2021-04-14 VITALS — OXYGEN SATURATION: 81 %

## 2021-04-14 VITALS — OXYGEN SATURATION: 77 %

## 2021-04-14 LAB
ANION GAP SERPL CALC-SCNC: 4 MMOL/L (ref 7–16)
BASOPHILS # BLD: 0 10*3/UL (ref 0–0.2)
BASOPHILS NFR BLD AUTO: 0.3 % (ref 0–2)
BUN SERPL-MCNC: 33 MG/DL (ref 9–20)
CALCIUM SERPL-MCNC: 7.3 MG/DL (ref 8.4–10.2)
CHLORIDE SERPL-SCNC: 110 MMOL/L (ref 98–107)
CO2 SERPL-SCNC: 28 MMOL/L (ref 22–30)
CREAT SERPL-SCNC: 1.49 UMOL/L (ref 0.66–1.25)
EOSINOPHIL # BLD: 0.1 10*3/UL (ref 0–0.7)
EOSINOPHIL NFR BLD: 2.2 % (ref 0–4)
ERYTHROCYTE [DISTWIDTH] IN BLOOD BY AUTOMATED COUNT: 19.3 % (ref 11.5–14.5)
GLUCOSE SERPL-MCNC: 136 MG/DL (ref 74–106)
GRANULOCYTES # BLD AUTO: 68.7 % (ref 42.2–75.2)
HCT VFR BLD AUTO: 22.2 % (ref 42–52)
HCT VFR BLD AUTO: 22.5 % (ref 42–52)
HCT VFR BLD AUTO: 22.6 % (ref 42–52)
HGB BLD-MCNC: 7.3 G/DL (ref 13.5–18)
HGB BLD-MCNC: 7.6 G/DL (ref 13.5–18)
HGB BLD-MCNC: 7.8 G/DL (ref 13.5–18)
LYMPHOCYTES # BLD: 1.2 10*3/UL (ref 1.2–3.4)
LYMPHOCYTES NFR BLD: 20.4 % (ref 20–51)
MCH RBC QN AUTO: 31 PG (ref 27–31)
MCHC RBC AUTO-ENTMCNC: 34 G/DL (ref 33–37)
MCV RBC AUTO: 91 FL (ref 80–100)
MONOCYTES # BLD: 0.5 10*3/UL (ref 0.1–0.6)
MONOCYTES NFR BLD AUTO: 7.9 % (ref 1.7–9.3)
NEUTROPHILS # BLD: 4 10*3/UL (ref 1.4–6.5)
PLATELET # BLD AUTO: 114 K/MM3 (ref 130–400)
PMV BLD AUTO: 9.4 FL (ref 7.4–10.4)
POTASSIUM SERPL-SCNC: 3.4 MMOL/L (ref 3.4–5)
RBC # BLD AUTO: 2.45 M/MM3 (ref 4.2–5.6)
SODIUM SERPL-SCNC: 142 MMOL/L (ref 137–145)

## 2021-04-14 NOTE — NUR
Notified WAYNE Carreno of patient hgb level of 7.8 one hour after receiving 1 unit
of PRBC. No new orders at this time.

## 2021-04-14 NOTE — NUR
Notified WAYNE Carreno of patient potassium level of 3.4. Received orders to place
patient on potassium replacement protocol.

## 2021-04-14 NOTE — NUR
attended clinical rounds with the team. Due to the patient's
condition, the patient is to be transferred to [facility] for further care.
The patient was in agreeance.  [facility] has accepted. House supervisor
will arrange transportation.

## 2021-04-14 NOTE — NUR
Report given to ELIJAH Vargas at  on [facility]. Full range of motion of upper and lower extremities, no joint tenderness/swelling.

## 2021-06-01 ENCOUNTER — HOSPITAL ENCOUNTER (OUTPATIENT)
Dept: HOSPITAL 19 - COL.RAD | Age: 81
Setting detail: OBSERVATION
LOS: 1 days | Discharge: HOME | End: 2021-06-02
Attending: INTERNAL MEDICINE | Admitting: INTERNAL MEDICINE
Payer: MEDICARE

## 2021-06-01 VITALS — OXYGEN SATURATION: 98 %

## 2021-06-01 VITALS — OXYGEN SATURATION: 97 %

## 2021-06-01 VITALS
DIASTOLIC BLOOD PRESSURE: 109 MMHG | OXYGEN SATURATION: 98 % | TEMPERATURE: 98.7 F | HEART RATE: 92 BPM | SYSTOLIC BLOOD PRESSURE: 153 MMHG

## 2021-06-01 VITALS — OXYGEN SATURATION: 96 %

## 2021-06-01 VITALS — OXYGEN SATURATION: 95 %

## 2021-06-01 VITALS — OXYGEN SATURATION: 99 %

## 2021-06-01 VITALS — HEART RATE: 73 BPM | DIASTOLIC BLOOD PRESSURE: 69 MMHG | SYSTOLIC BLOOD PRESSURE: 138 MMHG

## 2021-06-01 VITALS
DIASTOLIC BLOOD PRESSURE: 80 MMHG | HEART RATE: 84 BPM | TEMPERATURE: 98.7 F | SYSTOLIC BLOOD PRESSURE: 158 MMHG | OXYGEN SATURATION: 100 %

## 2021-06-01 VITALS
TEMPERATURE: 98.7 F | OXYGEN SATURATION: 99 % | DIASTOLIC BLOOD PRESSURE: 80 MMHG | HEART RATE: 84 BPM | SYSTOLIC BLOOD PRESSURE: 158 MMHG

## 2021-06-01 VITALS — OXYGEN SATURATION: 100 %

## 2021-06-01 VITALS — DIASTOLIC BLOOD PRESSURE: 92 MMHG | HEART RATE: 88 BPM | SYSTOLIC BLOOD PRESSURE: 140 MMHG | TEMPERATURE: 98.1 F

## 2021-06-01 VITALS — OXYGEN SATURATION: 94 %

## 2021-06-01 VITALS
TEMPERATURE: 98 F | SYSTOLIC BLOOD PRESSURE: 130 MMHG | HEART RATE: 85 BPM | DIASTOLIC BLOOD PRESSURE: 50 MMHG | OXYGEN SATURATION: 94 %

## 2021-06-01 VITALS
HEART RATE: 88 BPM | OXYGEN SATURATION: 97 % | SYSTOLIC BLOOD PRESSURE: 145 MMHG | DIASTOLIC BLOOD PRESSURE: 70 MMHG | TEMPERATURE: 98 F

## 2021-06-01 VITALS — SYSTOLIC BLOOD PRESSURE: 161 MMHG | DIASTOLIC BLOOD PRESSURE: 82 MMHG | HEART RATE: 76 BPM

## 2021-06-01 VITALS — OXYGEN SATURATION: 93 %

## 2021-06-01 VITALS
TEMPERATURE: 98.7 F | OXYGEN SATURATION: 97 % | SYSTOLIC BLOOD PRESSURE: 153 MMHG | HEART RATE: 82 BPM | DIASTOLIC BLOOD PRESSURE: 75 MMHG

## 2021-06-01 VITALS
TEMPERATURE: 98.7 F | DIASTOLIC BLOOD PRESSURE: 76 MMHG | OXYGEN SATURATION: 100 % | HEART RATE: 92 BPM | SYSTOLIC BLOOD PRESSURE: 146 MMHG

## 2021-06-01 VITALS
DIASTOLIC BLOOD PRESSURE: 87 MMHG | HEART RATE: 84 BPM | OXYGEN SATURATION: 95 % | TEMPERATURE: 98.7 F | SYSTOLIC BLOOD PRESSURE: 155 MMHG

## 2021-06-01 VITALS — OXYGEN SATURATION: 90 %

## 2021-06-01 VITALS — WEIGHT: 170.2 LBS | HEIGHT: 67.99 IN | BODY MASS INDEX: 25.79 KG/M2

## 2021-06-01 VITALS — HEART RATE: 80 BPM | SYSTOLIC BLOOD PRESSURE: 153 MMHG | DIASTOLIC BLOOD PRESSURE: 69 MMHG

## 2021-06-01 VITALS — OXYGEN SATURATION: 91 %

## 2021-06-01 VITALS
HEART RATE: 92 BPM | OXYGEN SATURATION: 94 % | SYSTOLIC BLOOD PRESSURE: 141 MMHG | DIASTOLIC BLOOD PRESSURE: 66 MMHG | TEMPERATURE: 98.7 F

## 2021-06-01 VITALS — OXYGEN SATURATION: 89 %

## 2021-06-01 VITALS — HEART RATE: 74 BPM | DIASTOLIC BLOOD PRESSURE: 60 MMHG | SYSTOLIC BLOOD PRESSURE: 133 MMHG

## 2021-06-01 VITALS — DIASTOLIC BLOOD PRESSURE: 76 MMHG | HEART RATE: 89 BPM | SYSTOLIC BLOOD PRESSURE: 161 MMHG

## 2021-06-01 VITALS — SYSTOLIC BLOOD PRESSURE: 154 MMHG | DIASTOLIC BLOOD PRESSURE: 78 MMHG | HEART RATE: 72 BPM

## 2021-06-01 VITALS — OXYGEN SATURATION: 92 %

## 2021-06-01 DIAGNOSIS — K21.9: ICD-10-CM

## 2021-06-01 DIAGNOSIS — I25.10: ICD-10-CM

## 2021-06-01 DIAGNOSIS — Z79.899: ICD-10-CM

## 2021-06-01 DIAGNOSIS — Z79.01: ICD-10-CM

## 2021-06-01 DIAGNOSIS — I34.0: ICD-10-CM

## 2021-06-01 DIAGNOSIS — J44.9: ICD-10-CM

## 2021-06-01 DIAGNOSIS — I50.22: ICD-10-CM

## 2021-06-01 DIAGNOSIS — I49.5: ICD-10-CM

## 2021-06-01 DIAGNOSIS — E11.9: ICD-10-CM

## 2021-06-01 DIAGNOSIS — I48.0: Primary | ICD-10-CM

## 2021-06-01 DIAGNOSIS — Z86.718: ICD-10-CM

## 2021-06-01 DIAGNOSIS — Z87.891: ICD-10-CM

## 2021-06-01 DIAGNOSIS — N18.9: ICD-10-CM

## 2021-06-01 DIAGNOSIS — Z85.118: ICD-10-CM

## 2021-06-01 LAB
ANION GAP SERPL CALC-SCNC: 7 MMOL/L (ref 7–16)
BASOPHILS # BLD: 0 10*3/UL (ref 0–0.2)
BASOPHILS NFR BLD AUTO: 0.2 % (ref 0–2)
BUN SERPL-MCNC: 43 MG/DL (ref 9–20)
CALCIUM SERPL-MCNC: 9.1 MG/DL (ref 8.4–10.2)
CHLORIDE SERPL-SCNC: 106 MMOL/L (ref 98–107)
CO2 SERPL-SCNC: 27 MMOL/L (ref 22–30)
CREAT SERPL-SCNC: 1.73 UMOL/L (ref 0.66–1.25)
EOSINOPHIL # BLD: 0.1 10*3/UL (ref 0–0.7)
EOSINOPHIL NFR BLD: 1.4 % (ref 0–4)
ERYTHROCYTE [DISTWIDTH] IN BLOOD BY AUTOMATED COUNT: 17.2 % (ref 11.5–14.5)
GLUCOSE SERPL-MCNC: 205 MG/DL (ref 74–106)
GRANULOCYTES # BLD AUTO: 73.2 % (ref 42.2–75.2)
HCT VFR BLD AUTO: 28.4 % (ref 42–52)
HGB BLD-MCNC: 9.6 G/DL (ref 13.5–18)
INR BLD: 2.3 (ref 0.8–3)
LYMPHOCYTES # BLD: 0.9 10*3/UL (ref 1.2–3.4)
LYMPHOCYTES NFR BLD: 16.2 % (ref 20–51)
MCH RBC QN AUTO: 34 PG (ref 27–31)
MCHC RBC AUTO-ENTMCNC: 34 G/DL (ref 33–37)
MCV RBC AUTO: 101 FL (ref 80–100)
MONOCYTES # BLD: 0.5 10*3/UL (ref 0.1–0.6)
MONOCYTES NFR BLD AUTO: 8.8 % (ref 1.7–9.3)
NEUTROPHILS # BLD: 4.1 10*3/UL (ref 1.4–6.5)
PLATELET # BLD AUTO: 182 K/MM3 (ref 130–400)
PMV BLD AUTO: 9.6 FL (ref 7.4–10.4)
POTASSIUM SERPL-SCNC: 3.6 MMOL/L (ref 3.4–5)
PROTHROMBIN TIME: 25.8 SECONDS (ref 9.7–12.8)
RBC # BLD AUTO: 2.81 M/MM3 (ref 4.2–5.6)
SODIUM SERPL-SCNC: 140 MMOL/L (ref 137–145)

## 2021-06-01 PROCEDURE — G0378 HOSPITAL OBSERVATION PER HR: HCPCS

## 2021-06-01 PROCEDURE — G0379 DIRECT REFER HOSPITAL OBSERV: HCPCS

## 2021-06-02 VITALS — OXYGEN SATURATION: 97 %

## 2021-06-02 VITALS — OXYGEN SATURATION: 89 %

## 2021-06-02 VITALS — OXYGEN SATURATION: 96 %

## 2021-06-02 VITALS — DIASTOLIC BLOOD PRESSURE: 77 MMHG | TEMPERATURE: 98 F | SYSTOLIC BLOOD PRESSURE: 154 MMHG | HEART RATE: 86 BPM

## 2021-06-02 VITALS — OXYGEN SATURATION: 98 %

## 2021-06-02 VITALS — OXYGEN SATURATION: 94 %

## 2021-06-02 VITALS — OXYGEN SATURATION: 85 %

## 2021-06-02 VITALS — OXYGEN SATURATION: 93 %

## 2021-06-02 VITALS — OXYGEN SATURATION: 91 %

## 2021-06-02 VITALS — OXYGEN SATURATION: 95 %

## 2021-06-02 VITALS — OXYGEN SATURATION: 90 %

## 2021-06-02 VITALS — OXYGEN SATURATION: 86 %

## 2021-06-02 VITALS — OXYGEN SATURATION: 87 %

## 2021-06-02 VITALS — OXYGEN SATURATION: 84 %

## 2021-06-02 VITALS — OXYGEN SATURATION: 92 %

## 2021-06-02 VITALS — OXYGEN SATURATION: 100 %

## 2021-06-02 VITALS — OXYGEN SATURATION: 99 %

## 2021-06-02 VITALS — SYSTOLIC BLOOD PRESSURE: 127 MMHG | DIASTOLIC BLOOD PRESSURE: 68 MMHG | HEART RATE: 98 BPM | TEMPERATURE: 97.4 F

## 2021-06-02 VITALS — OXYGEN SATURATION: 82 %

## 2021-06-02 VITALS — OXYGEN SATURATION: 75 %

## 2021-06-02 VITALS — TEMPERATURE: 97.2 F | DIASTOLIC BLOOD PRESSURE: 78 MMHG | HEART RATE: 81 BPM | SYSTOLIC BLOOD PRESSURE: 120 MMHG

## 2021-06-02 VITALS — OXYGEN SATURATION: 69 %

## 2021-06-02 VITALS — OXYGEN SATURATION: 88 %

## 2021-06-02 LAB
ANION GAP SERPL CALC-SCNC: 6 MMOL/L (ref 7–16)
BASOPHILS # BLD: 0 10*3/UL (ref 0–0.2)
BASOPHILS NFR BLD AUTO: 0.3 % (ref 0–2)
BUN SERPL-MCNC: 32 MG/DL (ref 9–20)
CALCIUM SERPL-MCNC: 8.8 MG/DL (ref 8.4–10.2)
CHLORIDE SERPL-SCNC: 108 MMOL/L (ref 98–107)
CO2 SERPL-SCNC: 28 MMOL/L (ref 22–30)
CREAT SERPL-SCNC: 1.32 UMOL/L (ref 0.66–1.25)
EOSINOPHIL # BLD: 0.1 10*3/UL (ref 0–0.7)
EOSINOPHIL NFR BLD: 1.3 % (ref 0–4)
ERYTHROCYTE [DISTWIDTH] IN BLOOD BY AUTOMATED COUNT: 17 % (ref 11.5–14.5)
GLUCOSE SERPL-MCNC: 156 MG/DL (ref 74–106)
GRANULOCYTES # BLD AUTO: 78 % (ref 42.2–75.2)
HCT VFR BLD AUTO: 29.4 % (ref 42–52)
HGB BLD-MCNC: 9.4 G/DL (ref 13.5–18)
LYMPHOCYTES # BLD: 0.9 10*3/UL (ref 1.2–3.4)
LYMPHOCYTES NFR BLD: 14.8 % (ref 20–51)
MAGNESIUM SERPL-MCNC: 1.3 MG/DL (ref 1.6–2.3)
MCH RBC QN AUTO: 32 PG (ref 27–31)
MCHC RBC AUTO-ENTMCNC: 32 G/DL (ref 33–37)
MCV RBC AUTO: 100 FL (ref 80–100)
MONOCYTES # BLD: 0.3 10*3/UL (ref 0.1–0.6)
MONOCYTES NFR BLD AUTO: 5.3 % (ref 1.7–9.3)
NEUTROPHILS # BLD: 4.7 10*3/UL (ref 1.4–6.5)
PLATELET # BLD AUTO: 172 K/MM3 (ref 130–400)
PMV BLD AUTO: 9.5 FL (ref 7.4–10.4)
POTASSIUM SERPL-SCNC: 3.1 MMOL/L (ref 3.4–5)
RBC # BLD AUTO: 2.94 M/MM3 (ref 4.2–5.6)
SODIUM SERPL-SCNC: 142 MMOL/L (ref 137–145)

## 2021-06-02 NOTE — NUR
Dr. Mauricio in to speak with pt and wife and discuss possible admission due to
need for tele monitoring due to occasional pauses seen on cardiac monitor
following BENITA/CV. Pt is A&O, free of complaints and expresses understanding.
He expresses relief that clot has resolved and is understanding of need for
admission.
Initial consent signed for BENITA, after discussion between pt and Dr Mauricio, it
is requested that pt also be consented for cardioversion as well. Elective
cardioversion added to previous consent, and pt initials addition and consents
to both procedures.
PATIENT RESTING COMFORTABLE ON COT/ DENIES PAIN OR DISCOMFORT
PT ARRIVES VIA WC TO ICU 5. AMBULATES EASILY. PLACED ON BEDSIDE CONTINUOUS
MONITOR. WIFE WITH PT. VSS. CALL LIGHT AND URINAL WITHIN REACH. DEMONSTRATES
UNDERSTANDING OF CALL LIGHT. DISCUSSED POC WITH PT, VERBALIZED UNDERSTANDING.
PT able to dress himself for discharge with a steady gait. Awaiting ride at
1130.
RECEIVED REPORT FROM ELIJAH BOWMAN IN [facility] UNIT. AWAITING ARRIVAL OF PT TO ICU
5.
Report was given to ELIJAH Hoyt. Pt taken by  to ICU 5 with belongings. Pt
remains A&O, has tolerated PO fluids without issue. Pauses appear less
frequent on monitor, and pt continues to be moniorted by tele tech. Wife
accompanies pt to ICU, and then is shown to exit so she may go home to get
additional personal belongings.
The patient is independent and discharged before this  could
complete intake.
Private Vehicle

## 2021-06-21 ENCOUNTER — HOSPITAL ENCOUNTER (OUTPATIENT)
Dept: HOSPITAL 19 - COL.ER | Age: 81
Setting detail: OBSERVATION
LOS: 1 days | Discharge: HOME | End: 2021-06-22
Attending: INTERNAL MEDICINE | Admitting: EMERGENCY MEDICINE
Payer: MEDICARE

## 2021-06-21 VITALS — BODY MASS INDEX: 24.89 KG/M2 | WEIGHT: 164.24 LBS | HEIGHT: 67.99 IN

## 2021-06-21 DIAGNOSIS — I25.9: ICD-10-CM

## 2021-06-21 DIAGNOSIS — I50.20: ICD-10-CM

## 2021-06-21 DIAGNOSIS — Z79.01: ICD-10-CM

## 2021-06-21 DIAGNOSIS — N18.9: ICD-10-CM

## 2021-06-21 DIAGNOSIS — R68.83: Primary | ICD-10-CM

## 2021-06-21 DIAGNOSIS — Z79.899: ICD-10-CM

## 2021-06-21 DIAGNOSIS — I48.91: ICD-10-CM

## 2021-06-21 DIAGNOSIS — Z87.891: ICD-10-CM

## 2021-06-21 LAB
ALBUMIN SERPL-MCNC: 4.7 GM/DL (ref 3.5–5)
ALP SERPL-CCNC: 112 U/L (ref 50–136)
ALT SERPL-CCNC: 23 U/L (ref 4–49)
ANION GAP SERPL CALC-SCNC: 12 MMOL/L (ref 7–16)
ANISOCYTOSIS BLD QL: (no result)
AST SERPL-CCNC: 38 U/L (ref 15–37)
BASOPHILS NFR BLD MANUAL: 1 % (ref 0–2)
BILIRUB SERPL-MCNC: 0.4 MG/DL (ref 0–1)
BUN SERPL-MCNC: 62 MG/DL (ref 9–20)
CALCIUM SERPL-MCNC: 9.6 MG/DL (ref 8.4–10.2)
CHLORIDE SERPL-SCNC: 103 MMOL/L (ref 98–107)
CK SERPL-CCNC: 95 U/L (ref 55–170)
CO2 SERPL-SCNC: 25 MMOL/L (ref 22–30)
CREAT SERPL-SCNC: 2.2 UMOL/L (ref 0.66–1.25)
EOSINOPHIL NFR BLD: 1 % (ref 0–4)
ERYTHROCYTE [DISTWIDTH] IN BLOOD BY AUTOMATED COUNT: 15.4 % (ref 11.5–14.5)
GLUCOSE SERPL-MCNC: 134 MG/DL (ref 74–106)
HCT VFR BLD AUTO: 30.3 % (ref 42–52)
HGB BLD-MCNC: 10.4 G/DL (ref 13.5–18)
LYMPHOCYTES NFR BLD MANUAL: 3 % (ref 20–51)
MCH RBC QN AUTO: 34 PG (ref 27–31)
MCHC RBC AUTO-ENTMCNC: 34 G/DL (ref 33–37)
MCV RBC AUTO: 100 FL (ref 80–100)
MONOCYTES NFR BLD: 2 % (ref 1.7–9.3)
NEUTS SEG NFR BLD MANUAL: 93 % (ref 42–75.2)
PLATELET # BLD AUTO: 204 K/MM3 (ref 130–400)
PLATELET BLD QL SMEAR: NORMAL
PMV BLD AUTO: 9.9 FL (ref 7.4–10.4)
POTASSIUM SERPL-SCNC: 3.5 MMOL/L (ref 3.4–5)
PROT SERPL-MCNC: 7.9 GM/DL (ref 6.4–8.2)
RBC # BLD AUTO: 3.03 M/MM3 (ref 4.2–5.6)
SODIUM SERPL-SCNC: 140 MMOL/L (ref 137–145)
TROPONIN I SERPL-MCNC: 0.06 NG/ML (ref 0–0.04)
URN COLLECT METHOD CLASS: (no result)

## 2021-06-21 PROCEDURE — G0378 HOSPITAL OBSERVATION PER HR: HCPCS

## 2021-06-22 VITALS — DIASTOLIC BLOOD PRESSURE: 51 MMHG | TEMPERATURE: 99.9 F | HEART RATE: 71 BPM | SYSTOLIC BLOOD PRESSURE: 142 MMHG

## 2021-06-22 LAB
ANION GAP SERPL CALC-SCNC: 7 MMOL/L (ref 7–16)
ANISOCYTOSIS BLD QL: (no result)
BASE EXCESS BLDA CALC-SCNC: -1.4 MMOL/L (ref -2–2)
BASOPHILS NFR BLD MANUAL: 1 % (ref 0–2)
BUN SERPL-MCNC: 59 MG/DL (ref 9–20)
CALCIUM SERPL-MCNC: 8.9 MG/DL (ref 8.4–10.2)
CHLORIDE SERPL-SCNC: 106 MMOL/L (ref 98–107)
CO2 BLDA-SCNC: 22.6 MMOL/L
CO2 SERPL-SCNC: 26 MMOL/L (ref 22–30)
CREAT SERPL-SCNC: 2.03 UMOL/L (ref 0.66–1.25)
ERYTHROCYTE [DISTWIDTH] IN BLOOD BY AUTOMATED COUNT: 15.5 % (ref 11.5–14.5)
GLUCOSE SERPL-MCNC: 142 MG/DL (ref 74–106)
HCO3 BLDA-SCNC: 21.7 MEQ/L (ref 22–26)
HCT VFR BLD AUTO: 25.9 % (ref 42–52)
HGB BLD-MCNC: 8.8 G/DL (ref 13.5–18)
LYMPHOCYTES NFR BLD MANUAL: 4 % (ref 20–51)
MCH RBC QN AUTO: 33 PG (ref 27–31)
MCHC RBC AUTO-ENTMCNC: 34 G/DL (ref 33–37)
MCV RBC AUTO: 96 FL (ref 80–100)
MONOCYTES NFR BLD: 2 % (ref 1.7–9.3)
NEUTS SEG NFR BLD MANUAL: 93 % (ref 42–75.2)
PCO2 BLDA: 30.6 MMHG (ref 35–45)
PH UR STRIP.AUTO: 5 [PH] (ref 5–8)
PLATELET # BLD AUTO: 168 K/MM3 (ref 130–400)
PLATELET BLD QL SMEAR: NORMAL
PMV BLD AUTO: 10.1 FL (ref 7.4–10.4)
PO2 BLDA: 113.4 MMHG (ref 80–100)
POTASSIUM SERPL-SCNC: 3.4 MMOL/L (ref 3.4–5)
RBC # BLD AUTO: 2.69 M/MM3 (ref 4.2–5.6)
RBC # UR STRIP.AUTO: (no result) /UL
RBC # UR: (no result) /HPF
SAO2 % BLDA: 97.8 % (ref 92–100)
SODIUM SERPL-SCNC: 139 MMOL/L (ref 137–145)
SP GR UR STRIP.AUTO: 1.01 (ref 1–1.03)
WBC # UR: (no result) /HPF

## 2021-08-04 ENCOUNTER — HOSPITAL ENCOUNTER (EMERGENCY)
Dept: HOSPITAL 19 - COL.ER | Age: 81
Discharge: ANOTHER HOSPITAL | End: 2021-08-04
Attending: STUDENT IN AN ORGANIZED HEALTH CARE EDUCATION/TRAINING PROGRAM
Payer: MEDICARE

## 2021-08-04 VITALS — SYSTOLIC BLOOD PRESSURE: 127 MMHG | HEART RATE: 90 BPM | DIASTOLIC BLOOD PRESSURE: 57 MMHG

## 2021-08-04 VITALS — HEART RATE: 87 BPM | DIASTOLIC BLOOD PRESSURE: 67 MMHG | SYSTOLIC BLOOD PRESSURE: 126 MMHG | TEMPERATURE: 97.7 F

## 2021-08-04 VITALS — SYSTOLIC BLOOD PRESSURE: 145 MMHG | DIASTOLIC BLOOD PRESSURE: 60 MMHG | HEART RATE: 93 BPM

## 2021-08-04 VITALS — BODY MASS INDEX: 24.59 KG/M2 | HEIGHT: 67.99 IN | WEIGHT: 162.26 LBS

## 2021-08-04 VITALS — HEART RATE: 80 BPM | DIASTOLIC BLOOD PRESSURE: 68 MMHG | SYSTOLIC BLOOD PRESSURE: 149 MMHG

## 2021-08-04 VITALS — DIASTOLIC BLOOD PRESSURE: 68 MMHG | SYSTOLIC BLOOD PRESSURE: 135 MMHG | HEART RATE: 80 BPM

## 2021-08-04 VITALS — HEART RATE: 88 BPM | TEMPERATURE: 97.6 F | SYSTOLIC BLOOD PRESSURE: 122 MMHG | DIASTOLIC BLOOD PRESSURE: 53 MMHG

## 2021-08-04 VITALS — SYSTOLIC BLOOD PRESSURE: 148 MMHG | DIASTOLIC BLOOD PRESSURE: 68 MMHG | TEMPERATURE: 98.1 F | HEART RATE: 76 BPM

## 2021-08-04 DIAGNOSIS — Z79.02: ICD-10-CM

## 2021-08-04 DIAGNOSIS — Z79.01: ICD-10-CM

## 2021-08-04 DIAGNOSIS — K92.2: Primary | ICD-10-CM

## 2021-08-04 DIAGNOSIS — I48.91: ICD-10-CM

## 2021-08-04 DIAGNOSIS — Z79.899: ICD-10-CM

## 2021-08-04 DIAGNOSIS — Z95.810: ICD-10-CM

## 2021-08-04 LAB
ALBUMIN SERPL-MCNC: 4 GM/DL (ref 3.5–5)
ALP SERPL-CCNC: 81 U/L (ref 50–136)
ALT SERPL-CCNC: 18 U/L (ref 4–49)
ANION GAP SERPL CALC-SCNC: 14 MMOL/L (ref 7–16)
APTT PPP: 28 SECONDS (ref 26–37)
AST SERPL-CCNC: 29 U/L (ref 15–37)
BASOPHILS # BLD: 0 10*3/UL (ref 0–0.2)
BASOPHILS NFR BLD AUTO: 0.3 % (ref 0–2)
BILIRUB SERPL-MCNC: 0.2 MG/DL (ref 0–1)
BUN SERPL-MCNC: 76 MG/DL (ref 9–20)
CALCIUM SERPL-MCNC: 9.2 MG/DL (ref 8.4–10.2)
CHLORIDE SERPL-SCNC: 103 MMOL/L (ref 98–107)
CO2 SERPL-SCNC: 21 MMOL/L (ref 22–30)
CREAT SERPL-SCNC: 2.41 UMOL/L (ref 0.66–1.25)
EOSINOPHIL # BLD: 0 10*3/UL (ref 0–0.7)
EOSINOPHIL NFR BLD: 0.7 % (ref 0–4)
ERYTHROCYTE [DISTWIDTH] IN BLOOD BY AUTOMATED COUNT: 15.6 % (ref 11.5–14.5)
GLUCOSE SERPL-MCNC: 165 MG/DL (ref 74–106)
GRANULOCYTES # BLD AUTO: 75.4 % (ref 42.2–75.2)
HCT VFR BLD AUTO: 20.8 % (ref 42–52)
HGB BLD-MCNC: 6.8 G/DL (ref 13.5–18)
INR BLD: 1.4 (ref 0.8–3)
LYMPHOCYTES # BLD: 1 10*3/UL (ref 1.2–3.4)
LYMPHOCYTES NFR BLD: 16.1 % (ref 20–51)
MCH RBC QN AUTO: 34 PG (ref 27–31)
MCHC RBC AUTO-ENTMCNC: 33 G/DL (ref 33–37)
MCV RBC AUTO: 103 FL (ref 80–100)
MONOCYTES # BLD: 0.4 10*3/UL (ref 0.1–0.6)
MONOCYTES NFR BLD AUTO: 7.2 % (ref 1.7–9.3)
NEUTROPHILS # BLD: 4.6 10*3/UL (ref 1.4–6.5)
PH UR STRIP.AUTO: 5 [PH] (ref 5–8)
PLATELET # BLD AUTO: 206 K/MM3 (ref 130–400)
PMV BLD AUTO: 9.6 FL (ref 7.4–10.4)
POTASSIUM SERPL-SCNC: 4.1 MMOL/L (ref 3.4–5)
PROT SERPL-MCNC: 6.8 GM/DL (ref 6.4–8.2)
PROTHROMBIN TIME: 15 SECONDS (ref 9.7–12.8)
RBC # BLD AUTO: 2.02 M/MM3 (ref 4.2–5.6)
RBC # UR: (no result) /HPF
SODIUM SERPL-SCNC: 138 MMOL/L (ref 137–145)
SP GR UR STRIP.AUTO: 1.01 (ref 1–1.03)
SQUAMOUS # URNS: (no result) /HPF
URN COLLECT METHOD CLASS: (no result)

## 2021-08-04 PROCEDURE — P9016 RBC LEUKOCYTES REDUCED: HCPCS

## 2021-08-04 PROCEDURE — C9113 INJ PANTOPRAZOLE SODIUM, VIA: HCPCS

## 2021-11-09 ENCOUNTER — HOSPITAL ENCOUNTER (OUTPATIENT)
Dept: HOSPITAL 6 - RAD | Age: 81
End: 2021-11-09
Attending: INTERNAL MEDICINE
Payer: MEDICARE

## 2021-11-09 DIAGNOSIS — C34.11: Primary | ICD-10-CM

## 2021-11-09 PROCEDURE — A9585 GADOBUTROL INJECTION: HCPCS

## 2023-02-23 ENCOUNTER — HOSPITAL ENCOUNTER (OUTPATIENT)
Dept: HOSPITAL 19 - EUO | Age: 83
Discharge: HOME | End: 2023-02-23
Payer: MEDICARE

## 2023-02-23 VITALS — SYSTOLIC BLOOD PRESSURE: 106 MMHG | DIASTOLIC BLOOD PRESSURE: 72 MMHG | HEART RATE: 97 BPM | TEMPERATURE: 97 F

## 2023-02-23 VITALS — DIASTOLIC BLOOD PRESSURE: 97 MMHG | SYSTOLIC BLOOD PRESSURE: 136 MMHG | TEMPERATURE: 96.7 F | HEART RATE: 94 BPM

## 2023-02-23 VITALS — HEART RATE: 98 BPM | TEMPERATURE: 98.4 F | SYSTOLIC BLOOD PRESSURE: 100 MMHG | DIASTOLIC BLOOD PRESSURE: 69 MMHG

## 2023-02-23 VITALS — WEIGHT: 196.43 LBS | HEIGHT: 67.99 IN | BODY MASS INDEX: 29.77 KG/M2

## 2023-02-23 VITALS — TEMPERATURE: 96.4 F | HEART RATE: 100 BPM | DIASTOLIC BLOOD PRESSURE: 80 MMHG | SYSTOLIC BLOOD PRESSURE: 126 MMHG

## 2023-02-23 VITALS — HEART RATE: 98 BPM | SYSTOLIC BLOOD PRESSURE: 119 MMHG | TEMPERATURE: 97.4 F | DIASTOLIC BLOOD PRESSURE: 84 MMHG

## 2023-02-23 VITALS — TEMPERATURE: 97.6 F | DIASTOLIC BLOOD PRESSURE: 98 MMHG | SYSTOLIC BLOOD PRESSURE: 128 MMHG | HEART RATE: 101 BPM

## 2023-02-23 VITALS — DIASTOLIC BLOOD PRESSURE: 89 MMHG | TEMPERATURE: 97.7 F | HEART RATE: 96 BPM | SYSTOLIC BLOOD PRESSURE: 121 MMHG

## 2023-02-23 VITALS — SYSTOLIC BLOOD PRESSURE: 96 MMHG | TEMPERATURE: 96.4 F | HEART RATE: 93 BPM | DIASTOLIC BLOOD PRESSURE: 81 MMHG

## 2023-02-23 VITALS — HEART RATE: 98 BPM | DIASTOLIC BLOOD PRESSURE: 75 MMHG | SYSTOLIC BLOOD PRESSURE: 121 MMHG

## 2023-02-23 VITALS — DIASTOLIC BLOOD PRESSURE: 86 MMHG | SYSTOLIC BLOOD PRESSURE: 138 MMHG | TEMPERATURE: 96.4 F | HEART RATE: 97 BPM

## 2023-02-23 VITALS — TEMPERATURE: 96.3 F | SYSTOLIC BLOOD PRESSURE: 116 MMHG | DIASTOLIC BLOOD PRESSURE: 74 MMHG | HEART RATE: 100 BPM

## 2023-02-23 DIAGNOSIS — D63.1: ICD-10-CM

## 2023-02-23 DIAGNOSIS — R58: ICD-10-CM

## 2023-02-23 DIAGNOSIS — C34.11: ICD-10-CM

## 2023-02-23 DIAGNOSIS — D47.2: Primary | ICD-10-CM

## 2023-02-23 PROCEDURE — P9016 RBC LEUKOCYTES REDUCED: HCPCS

## 2023-02-23 NOTE — NUR
Pt assisted to use urinal in bed. BLE have been elevated on pillow covered
with a chux pad due to areas of weeping to BLE. Message was left at Dr Easton's
office and this nurse spoke with Dr Johnson regarding 2 to 3+ edema to BLE. Pt
states this has been present for 9 wks, and worse over last few with recent
weeping. Pt had arrived with hand towels wrapped around lower legs due to
weeping. These were removed to evaluate further. Multiple pinpoint areas of
weeping noted to BLE, with small amount of fluid on towels. Skin to legs is
soft and tender to touch, pt states it has
become very frail. No redness noted. Lung sounds are clear bilaterally, and pt
has denied c/o shortness of breath throughout transfusion. IV lasix will be
given between units. Report given to ELIJAH Hitchcock who will take over cares of pt.
Wife remains at bedside, call light in reach.

## 2023-02-23 NOTE — NUR
Pt tolerated the blood transfusion well.  No adverse or allergic reactions
were observed or reported.  Huber did sleep intermittently, but was
easily arousable and remained alert and oriented throughout.  He did not want
to eat during this time, but did take some ice chips.
Pt was given lasix in between infusions, however
he didn't have any urine output after this was given. at time of his
departure, pt is gcs 15, warm, dry, with unlabored and regular respirations.
There is some expiratory wheezing in upper fields and an occasional wheeze in
left lower lobe.  no crackles.   Pt states has albuterol inhaler at home and I
encouraged him to use that as directed.  Pt was escorted to his vehicle via
wheelchair.

## 2023-02-27 ENCOUNTER — HOSPITAL ENCOUNTER (OUTPATIENT)
Dept: HOSPITAL 19 - COL.RAD | Age: 83
End: 2023-02-27
Payer: MEDICARE

## 2023-02-27 DIAGNOSIS — I82.412: ICD-10-CM

## 2023-02-27 DIAGNOSIS — C34.11: ICD-10-CM

## 2023-02-27 DIAGNOSIS — I82.432: Primary | ICD-10-CM

## 2023-02-27 DIAGNOSIS — R58: ICD-10-CM

## 2023-02-27 DIAGNOSIS — D47.2: ICD-10-CM

## 2023-03-21 ENCOUNTER — HOSPITAL ENCOUNTER (OUTPATIENT)
Dept: HOSPITAL 19 - COL.RAD | Age: 83
End: 2023-03-21
Attending: INTERNAL MEDICINE
Payer: MEDICARE

## 2023-03-21 DIAGNOSIS — I82.412: Primary | ICD-10-CM

## 2023-03-21 DIAGNOSIS — I82.432: ICD-10-CM

## 2023-03-21 DIAGNOSIS — I82.462: ICD-10-CM

## 2024-11-26 NOTE — NUR
Assessment complete. Pt is AXO X3, denies having any pain at this time. Pt is
sitting up in the bed drinking his golytely and he denies further needs. Call
light within reach. normal shape